# Patient Record
Sex: FEMALE
[De-identification: names, ages, dates, MRNs, and addresses within clinical notes are randomized per-mention and may not be internally consistent; named-entity substitution may affect disease eponyms.]

---
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
## 2024-07-25 ENCOUNTER — APPOINTMENT (OUTPATIENT)
Dept: FAMILY MEDICINE | Age: 19
End: 2024-07-25

## 2024-07-30 ENCOUNTER — APPOINTMENT (OUTPATIENT)
Dept: FAMILY MEDICINE | Age: 19
End: 2024-07-30

## 2024-07-30 VITALS
RESPIRATION RATE: 17 BRPM | HEIGHT: 64 IN | WEIGHT: 173 LBS | HEART RATE: 84 BPM | OXYGEN SATURATION: 100 % | TEMPERATURE: 98.1 F | SYSTOLIC BLOOD PRESSURE: 101 MMHG | BODY MASS INDEX: 29.53 KG/M2 | DIASTOLIC BLOOD PRESSURE: 67 MMHG

## 2024-07-30 DIAGNOSIS — Z02.5 SPORTS PHYSICAL: Primary | ICD-10-CM

## 2024-07-30 PROCEDURE — 99203 OFFICE O/P NEW LOW 30 MIN: CPT | Performed by: NURSE PRACTITIONER

## 2024-07-30 ASSESSMENT — PATIENT HEALTH QUESTIONNAIRE - PHQ9
SUM OF ALL RESPONSES TO PHQ9 QUESTIONS 1 AND 2: 0
1. LITTLE INTEREST OR PLEASURE IN DOING THINGS: NOT AT ALL
CLINICAL INTERPRETATION OF PHQ2 SCORE: NO FURTHER SCREENING NEEDED
2. FEELING DOWN, DEPRESSED OR HOPELESS: NOT AT ALL
SUM OF ALL RESPONSES TO PHQ9 QUESTIONS 1 AND 2: 0

## 2024-07-30 ASSESSMENT — ENCOUNTER SYMPTOMS
POLYDIPSIA: 0
RHINORRHEA: 0
ABDOMINAL PAIN: 0
BRUISES/BLEEDS EASILY: 0
COUGH: 0
BACK PAIN: 0
EYE PAIN: 0
SHORTNESS OF BREATH: 0
EYE REDNESS: 0
WHEEZING: 0
VOMITING: 0
POLYPHAGIA: 0
NAUSEA: 0
DIARRHEA: 0
ADENOPATHY: 0
LIGHT-HEADEDNESS: 0
ACTIVITY CHANGE: 0
NUMBNESS: 0
WEAKNESS: 0
AGITATION: 0
DIZZINESS: 0
FATIGUE: 0
UNEXPECTED WEIGHT CHANGE: 0
SLEEP DISTURBANCE: 0
NERVOUS/ANXIOUS: 0
FEVER: 0
TREMORS: 0
SORE THROAT: 0
CONSTIPATION: 0
SINUS PAIN: 0

## 2025-08-08 ENCOUNTER — OFFICE VISIT (OUTPATIENT)
Dept: FAMILY MEDICINE CLINIC | Facility: CLINIC | Age: 20
End: 2025-08-08

## 2025-08-08 VITALS
HEIGHT: 63.29 IN | BODY MASS INDEX: 32.58 KG/M2 | TEMPERATURE: 98 F | OXYGEN SATURATION: 97 % | HEART RATE: 69 BPM | RESPIRATION RATE: 18 BRPM | SYSTOLIC BLOOD PRESSURE: 100 MMHG | WEIGHT: 186.19 LBS | DIASTOLIC BLOOD PRESSURE: 58 MMHG

## 2025-08-08 DIAGNOSIS — Z02.5 SPORTS PHYSICAL: Primary | ICD-10-CM

## (undated) NOTE — LETTER
[facility] [employer] of Child Health Examination       Student's Name  Laith Chopra Da immunization history must sign below.   Signature                                                                                                                                   Title                           Date     Signature Birth Date  11/8/2005  Sex  Female School   Grade Level/ID#  11th Grade   HEALTH HISTORY          TO BE COMPLETED AND SIGNED BY PARENT/GUARDIAN AND VERIFIED BY HEALTH CARE PROVIDER    ALLERGIES  (Food, drug, insect, other)  Patient has no known allergies. REQUIREMENTS (head circumference if <33 years old):   /70   Pulse 72   Temp 97.5 °F (36.4 °C) (Temporal)   Resp 16   Ht 5' 3.75\" (1.619 m)   Wt 169 lb 6 oz (76.8 kg)   SpO2 98%   BMI 29.30 kg/m²     DIABETES SCREENING  BMI>85% age/sex  yes And any Cardiovascular/HTN Yes  Nutritional status Yes    Respiratory Yes                   Diagnosis of Asthma: No Mental Health Yes        Currently Prescribed Asthma Medication:            Quick-relief  medication (e.g. Short Acting Beta Antagonist):  No

## (undated) NOTE — LETTER
[facility] [employer] Child Health Examination       Student's Name  Latosha Monet Birth Da history must sign below.   Signature                                                                                                                                   Title                           Date     Signature 11/8/2005  Sex  Female School   Grade Level/ID#  11th Grade   HEALTH HISTORY          TO BE COMPLETED AND SIGNED BY PARENT/GUARDIAN AND VERIFIED BY HEALTH CARE PROVIDER    ALLERGIES  (Food, drug, insect, other)  Patient has no known allergies.  MEDICATION REQUIREMENTS (head circumference if <33 years old):   /70   Pulse 72   Temp 97.5 °F (36.4 °C) (Temporal)   Resp 16   Ht 5' 3.75\" (1.619 m)   Wt 169 lb 6 oz   SpO2 98%   BMI 29.30 kg/m²     DIABETES SCREENING  BMI>85% age/sex  No And any two of the Nutritional status Yes    Respiratory Yes                   Diagnosis of Asthma: No Mental Health Yes        Currently Prescribed Asthma Medication:            Quick-relief  medication (e.g. Short Acting Beta Antagonist): No          Controller medication

## (undated) NOTE — Clinical Note
Patient Name: Irish Kaminski  : 2005  MRN: [id number]  Patient Address: [address] [place] [id number]      Coronavirus Disease 2019 (COVID-19)     [facility] is committed to the safety and well-being of our patients, members carefully. If your symptoms get worse, call your healthcare provider immediately. 3. Get rest and stay hydrated.    4. If you have a medical appointment, call the healthcare provider ahead of time and tell them that you have or may have COVID-19.  5. For m of fever-reducing medications; and  · Improvement in respiratory symptoms (e.g., cough, shortness of breath); and  · At least 10 days have passed since symptoms first appeared OR if asymptomatic patient or date of symptom onset is unclear then use 10 days donors must:    · Have had a confirmed diagnosis of COVID-19  · Be symptom-free for at least 14 days*    *Some people will be required to have a repeat COVID-19 test in order to be eligible to donate.  If you’re instructed by Rohini that a repeat test is r random. Researchers are trying to identify similarities between people with a Post-COVID condition to better understand if there are risk factors. How do I prevent a Post-COVID condition?   The best way to prevent the long-term symptoms of COVID-19 is

## (undated) NOTE — LETTER
Date & Time: 3/17/2019, 6:51 PM  Patient: Fredrick Fry  Encounter Provider(s):    MD Yaakov Cole, PA       To Whom It May Concern:    Fredrick Fry was seen and treated in our department on 3/17/2019.  She should not partici

## (undated) NOTE — LETTER
Name:  Cyn Florence Year:  11th Grade Class: Student ID No.:   Address:  [address] [place] [address]  Jose D [facility] [address] Phone:  [phone] (home)  :  11year old   Name Relationship [address] Work Phone Home Phone Mobile Phone   1.  BERNABE polymorphic ventricular tachycardia? No   15. Does anyone in your family have a heart problem, pacemaker, or implanted defibrillator? No   16. Has anyone in your family had unexplained fainting, seizures, or near drowning?  No   BONE AND JOINT QUESTIONS headaches with exercise? No   38. Have you ever had numbness, tingling, or weakness in your arms or legs after being hit or falling? No   39. Have you ever been unable to move your arms / legs after being hit /fall? No   40.  Have you ever become ill while e ABNORMAL FINDINGS   Appearance:  Marfan stigmata (kyphoscoliosis, high-arched palate, pectus excavatum,      arachnodactyly, arm span > height, hyperlaxity, myopia, MVP, aortic insufficiency) Yes    Eyes/Ears/Nose/Throat:    · Pupils equal  · Hearing Yes student will not use performance-enhancing substances as defined in the [facility] Performance-Enhancing Substance Testing Program Protocol.  We have reviewed the policy and understand that I/our student may be asked to submit to testing for the presence of perfo

## (undated) NOTE — ED AVS SNAPSHOT
Yuni Marshall   MRN: [id number]    Department:  [address] [facility] in [place]   Date of Visit:  3/17/2019           Disclosure     Insurance plans vary and the physician(s) referred by the ER may not be covered by your plan.  Please contact tell this physician (or your personal doctor if your instructions are to return to your personal doctor) about any new or lasting problems. The primary care or specialist physician will see patients referred from the [facility] Emergency Department.  Israel Berry